# Patient Record
Sex: FEMALE | Race: WHITE | URBAN - METROPOLITAN AREA
[De-identification: names, ages, dates, MRNs, and addresses within clinical notes are randomized per-mention and may not be internally consistent; named-entity substitution may affect disease eponyms.]

---

## 2017-04-12 ENCOUNTER — EMERGENCY (EMERGENCY)
Facility: HOSPITAL | Age: 47
LOS: 0 days | Discharge: HOME | End: 2017-04-12

## 2017-06-27 DIAGNOSIS — K57.32 DIVERTICULITIS OF LARGE INTESTINE WITHOUT PERFORATION OR ABSCESS WITHOUT BLEEDING: ICD-10-CM

## 2017-06-27 DIAGNOSIS — Z98.51 TUBAL LIGATION STATUS: ICD-10-CM

## 2017-06-27 DIAGNOSIS — R10.9 UNSPECIFIED ABDOMINAL PAIN: ICD-10-CM

## 2017-06-27 DIAGNOSIS — Z98.890 OTHER SPECIFIED POSTPROCEDURAL STATES: ICD-10-CM

## 2017-11-20 PROBLEM — Z00.00 ENCOUNTER FOR PREVENTIVE HEALTH EXAMINATION: Status: ACTIVE | Noted: 2017-11-20

## 2018-02-06 ENCOUNTER — APPOINTMENT (OUTPATIENT)
Dept: BREAST CENTER | Facility: CLINIC | Age: 48
End: 2018-02-06
Payer: COMMERCIAL

## 2018-02-06 VITALS
WEIGHT: 135 LBS | HEART RATE: 73 BPM | BODY MASS INDEX: 23.92 KG/M2 | DIASTOLIC BLOOD PRESSURE: 80 MMHG | SYSTOLIC BLOOD PRESSURE: 120 MMHG | HEIGHT: 63 IN | OXYGEN SATURATION: 97 %

## 2018-02-06 DIAGNOSIS — Z80.3 FAMILY HISTORY OF MALIGNANT NEOPLASM OF BREAST: ICD-10-CM

## 2018-02-06 DIAGNOSIS — Z80.1 FAMILY HISTORY OF MALIGNANT NEOPLASM OF TRACHEA, BRONCHUS AND LUNG: ICD-10-CM

## 2018-02-06 DIAGNOSIS — Z87.891 PERSONAL HISTORY OF NICOTINE DEPENDENCE: ICD-10-CM

## 2018-02-06 PROCEDURE — 99212 OFFICE O/P EST SF 10 MIN: CPT

## 2018-12-18 ENCOUNTER — APPOINTMENT (OUTPATIENT)
Dept: BREAST CENTER | Facility: CLINIC | Age: 48
End: 2018-12-18
Payer: COMMERCIAL

## 2019-02-14 ENCOUNTER — APPOINTMENT (OUTPATIENT)
Dept: BREAST CENTER | Facility: CLINIC | Age: 49
End: 2019-02-14
Payer: COMMERCIAL

## 2019-02-14 VITALS
HEIGHT: 63 IN | BODY MASS INDEX: 23.92 KG/M2 | TEMPERATURE: 98.2 F | SYSTOLIC BLOOD PRESSURE: 110 MMHG | WEIGHT: 135 LBS | DIASTOLIC BLOOD PRESSURE: 82 MMHG

## 2019-02-14 DIAGNOSIS — R92.2 INCONCLUSIVE MAMMOGRAM: ICD-10-CM

## 2019-02-14 PROCEDURE — 99212 OFFICE O/P EST SF 10 MIN: CPT

## 2020-01-07 ENCOUNTER — APPOINTMENT (OUTPATIENT)
Dept: BREAST CENTER | Facility: CLINIC | Age: 50
End: 2020-01-07
Payer: COMMERCIAL

## 2020-01-07 DIAGNOSIS — N60.12 DIFFUSE CYSTIC MASTOPATHY OF LEFT BREAST: ICD-10-CM

## 2020-01-07 DIAGNOSIS — N60.11 DIFFUSE CYSTIC MASTOPATHY OF LEFT BREAST: ICD-10-CM

## 2020-02-13 ENCOUNTER — APPOINTMENT (OUTPATIENT)
Dept: BREAST CENTER | Facility: CLINIC | Age: 50
End: 2020-02-13
Payer: COMMERCIAL

## 2020-02-13 VITALS
DIASTOLIC BLOOD PRESSURE: 76 MMHG | SYSTOLIC BLOOD PRESSURE: 122 MMHG | HEIGHT: 63 IN | TEMPERATURE: 98.5 F | BODY MASS INDEX: 23.92 KG/M2 | WEIGHT: 135 LBS

## 2020-02-13 DIAGNOSIS — R92.8 OTHER ABNORMAL AND INCONCLUSIVE FINDINGS ON DIAGNOSTIC IMAGING OF BREAST: ICD-10-CM

## 2020-02-13 PROBLEM — N60.12 BILATERAL FIBROCYSTIC BREAST DISEASE (FCBD): Status: ACTIVE | Noted: 2018-02-06

## 2020-02-13 PROCEDURE — 99213 OFFICE O/P EST LOW 20 MIN: CPT

## 2020-02-13 NOTE — DATA REVIEWED
[FreeTextEntry1] : B/L Screening Mammo & Sono - 12/02/19:\par MAMMOGRAM:\par Tomosynthesis and 2D imaging of the breast(s) were performed. Current study was also\par evaluated with a computer aided detection (CAD) system.\par Breast density: There are scattered areas of fibroglandular density.\par No significant masses, calcifications, or other findings are seen in the right breast.\par In the central left breast seen only in the CC projection there is an asymmetry for\par which additional imaging is recommended.\par Mammo BI-RADS 0: INCOMPLETE - NEED ADDITIONAL IMAGING EVALUATION\par US BREAST COMPLETE BILATERAL\par Ultrasound evaluation was performed including examination of all four quadrants of the\par breast(s) and the retroareolar region(s).\par No suspicious abnormalities were seen sonographically in either breast.\par Ultrasound BI-RADS 1: NEGATIVE\par OVERALL IMPRESSION: OVERALL BI-RADS 0: INCOMPLETE\par Indeterminant asymmetry in the left breast. Additional spot compression view, lateral\par view and possible ultrasound is recommended.\par There is no mammographic or sonographic evidence of malignancy in the right breast.\par An additional imaging exam of the left breast(s) is recommended.\par \par Left Uni Dx Mammo & Sono - 12/06/19:\par MAMMOGRAM:\par Tomosynthesis and 2D imaging of the breast(s) were performed. Current study was also\par evaluated with a computer aided detection (CAD) system.\par Breast Density: Heterogeneously dense, which may obscure small masses.\par The previously questioned asymmetry in the left breast has effaced on additional\par views, representing overlapping fibroglandular tissue. No significant masses,\par calcifications, or other findings are seen in the left breast.\par US BREAST LIMITED LEFT\par Color flow, Gray scale and real-time ultrasound of the left breast was performed and\par targeted to the area(s) of interest.\par There is an anechoic benign-appearing well-circumscribed 6 mm cyst in the left breast\par at 12:00 axis at 1 cm from nipple. There is minimal retroareolar duct ectasia without\par evidence of solid mass. No suspicious abnormalities were seen sonographically in the\par left breast.\par OVERALL IMPRESSION:\par There is no mammographic or sonographic evidence of malignancy.\par BI-RADS 2: BENIGN\par

## 2020-02-13 NOTE — HISTORY OF PRESENT ILLNESS
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 Courtney).\par s/p Left NLOC 5/1/06 - adipose breast parenchymal tissue with patchy periductal stromal fibrosis (Dr. Courtney).\par FHx breast cancer - paternal cousin x2, 40s.\par \par Her Pili Model risk assessment is:\par 2.3 % in five years \par 16.0 % in her lifetime.\par \par PAT CONTRERAS is a 49 year old female patient who presents today for follow up.\par Since her last visit, she has no new breast related complaints. \par Imaging was done on 12/02/19, which revealed an indeterminate asymmetry in the left breast.\par Additional imaging was performed on 12/06/19, which ultimately revealed no mammographic or sonographic evidence of malignancy.\par \par She presents today for evaluation and imaging review.

## 2020-02-13 NOTE — PHYSICAL EXAM
[Normocephalic] : normocephalic [Atraumatic] : atraumatic [Examined in the supine and seated position] : examined in the supine and seated position [No dominant masses] : no dominant masses in right breast  [Symmetrical] : symmetrical [No dominant masses] : no dominant masses left breast [No Nipple Discharge] : no left nipple discharge [No Ulceration] : no ulceration [No Rashes] : no rashes [Breast Nipple Inversion] : nipples not inverted [Breast Nipple Retraction] : nipples not retracted [de-identified] : well healed surgical scar.\par bilateral scattered fibroglandular densities. [de-identified] : No axillary lymphadenopathy appreciated. [de-identified] : No axillary lymphadenopathy appreciated.

## 2020-02-13 NOTE — ASSESSMENT
[FreeTextEntry1] : PAT is a samira 49 year old patient who presented today for follow up.\par She has been doing well with no new breast related complaints. \par Imaging was done recently which ultimately revealed no mammographic or sonographic evidence of malignancy, as detailed above. \par Physical exam was unrevealing today.\par \par Imaging with a bilateral screening mammogram and sonogram will be due in December 2020, \par and that will be scheduled today. \par She will return for follow-up and clinical breast exam in December 2020 as well.\par \par I spent a total of 15 minutes of face to face time with this patient, greater than 50% of which was spent in counseling and/or coordination of care.\par All of her questions were appropriately answered.\par She knows to call with any concerns.

## 2020-02-13 NOTE — PAST MEDICAL HISTORY
[FreeTextEntry5] : none [FreeTextEntry6] : none [FreeTextEntry8] : none. [FreeTextEntry7] : OCP for less than one year and discontinued 26 years old.

## 2021-08-10 NOTE — PAST MEDICAL HISTORY
[Menstruating] : The patient is menstruating [Menarche Age ____] : age at menarche was [unfilled] [Irregular Cycle Intervals] : are  irregular [Total Preg ___] : G[unfilled] [Live Births ___] : P[unfilled]  [Age At Live Birth ___] : Age at live birth: [unfilled] [FreeTextEntry5] : none [FreeTextEntry6] : none [FreeTextEntry7] : OCP for less than one year and discontinued 26 years old. [FreeTextEntry8] : none.

## 2021-08-10 NOTE — DATA REVIEWED
[FreeTextEntry1] : B/L Screening Mammo & Sono - 02/22/2021:\par MAMMOGRAM: \par  \par Tomosynthesis and 2D imaging of the breast(s) were performed.  Current study was also evaluated with a computer aided detection (CAD) system.\par  \par Breast density: There are scattered areas of fibroglandular density.\par  \par No significant masses, calcifications, or other findings are seen in either breast.\par  \par Mammo BI-RADS 1: NEGATIVE\par  \par  \par US BREAST COMPLETE BILATERAL\par  \par Ultrasound evaluation was performed including examination of all four quadrants of the breast(s) and the retroareolar region(s).\par  \par No suspicious abnormalities were seen sonographically in either breast. \par  \par Both axillary regions are unremarkable\par  \par Ultrasound BI-RADS 1: NEGATIVE\par  \par OVERALL IMPRESSION: OVERALL BI-RADS 1: NEGATIVE\par  \par There is no mammographic or sonographic evidence of malignancy.\par  \par A 1 year screening mammogram and ultrasound of both breast(s) is recommended.

## 2021-08-10 NOTE — HISTORY OF PRESENT ILLNESS
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 Courtney).\par s/p Left NLOC 5/1/06 - adipose breast parenchymal tissue with patchy periductal stromal fibrosis (Dr. Courtney).\par FHx breast cancer - paternal cousin x2, 40s.\par \par Her Pili Model risk assessment is:\par 2.3 % in five years \par 16.0 % in her lifetime.\par \par PAT CONTRERAS is a 50 year old female patient who presents today for follow up.\par Since her last visit, she has no new breast related complaints. \par Imaging was done on 02/22/2021, which revealed there is no mammographic or sonographic evidence of malignancy.\par \par She presents today for evaluation and imaging review.

## 2021-08-13 ENCOUNTER — APPOINTMENT (OUTPATIENT)
Dept: BREAST CENTER | Facility: CLINIC | Age: 51
End: 2021-08-13

## 2022-06-23 ENCOUNTER — APPOINTMENT (OUTPATIENT)
Dept: PAIN MANAGEMENT | Facility: CLINIC | Age: 52
End: 2022-06-23

## 2022-06-23 DIAGNOSIS — Z78.9 OTHER SPECIFIED HEALTH STATUS: ICD-10-CM

## 2024-04-11 ENCOUNTER — APPOINTMENT (OUTPATIENT)
Dept: CARDIOLOGY | Facility: CLINIC | Age: 54
End: 2024-04-11
Payer: COMMERCIAL

## 2024-04-11 VITALS — TEMPERATURE: 97.6 F | WEIGHT: 155 LBS | HEIGHT: 63 IN | BODY MASS INDEX: 27.46 KG/M2

## 2024-04-11 VITALS — HEART RATE: 81 BPM | SYSTOLIC BLOOD PRESSURE: 110 MMHG | DIASTOLIC BLOOD PRESSURE: 68 MMHG

## 2024-04-11 DIAGNOSIS — E87.5 HYPERKALEMIA: ICD-10-CM

## 2024-04-11 DIAGNOSIS — Z82.3 FAMILY HISTORY OF STROKE: ICD-10-CM

## 2024-04-11 DIAGNOSIS — R94.31 ABNORMAL ELECTROCARDIOGRAM [ECG] [EKG]: ICD-10-CM

## 2024-04-11 DIAGNOSIS — Z82.49 FAMILY HISTORY OF ISCHEMIC HEART DISEASE AND OTHER DISEASES OF THE CIRCULATORY SYSTEM: ICD-10-CM

## 2024-04-11 DIAGNOSIS — Z78.9 OTHER SPECIFIED HEALTH STATUS: ICD-10-CM

## 2024-04-11 DIAGNOSIS — E78.2 MIXED HYPERLIPIDEMIA: ICD-10-CM

## 2024-04-11 PROCEDURE — 93000 ELECTROCARDIOGRAM COMPLETE: CPT

## 2024-04-11 PROCEDURE — 99204 OFFICE O/P NEW MOD 45 MIN: CPT | Mod: 25

## 2024-04-11 RX ORDER — ROSUVASTATIN CALCIUM 5 MG/1
5 TABLET, FILM COATED ORAL
Refills: 0 | Status: ACTIVE | COMMUNITY

## 2024-06-18 ENCOUNTER — APPOINTMENT (OUTPATIENT)
Dept: CARDIOLOGY | Facility: CLINIC | Age: 54
End: 2024-06-18
Payer: COMMERCIAL

## 2024-06-18 PROBLEM — R94.31 ABNORMAL EKG: Status: ACTIVE | Noted: 2024-06-18

## 2024-06-18 PROCEDURE — 93880 EXTRACRANIAL BILAT STUDY: CPT

## 2024-06-19 NOTE — ASSESSMENT
[FreeTextEntry1] : Risk factors for CAD: hyperlipidemia and extensive FH of CAD - will obtain TTE to r/o structural /functional abnormalities - will do CT heart calcium score to r/o CAD - Carotid arteries US to r/o stenosis  Abnormal EKG: Short MS and also low voltage -Rule out structural abnormality.   HLD: , TG 78, HDL 47, LDL 80 (01/24) - cholesterol is well controlled. Will continue Rosuvastatin 5 mg PO QD - encouraged patient to follow healthy exercise and eating habits, focusing on Mediterranean style of eating and aiming for the recommended 150 minutes per week of moderate physical activity.  - will recheck K level as it was 5.4 on previous lab work.   F/U in 3 months

## 2024-06-19 NOTE — HISTORY OF PRESENT ILLNESS
[FreeTextEntry1] : Ms. Armendariz is a 54yo F with PMHx of HLD, extensive FH of CAD presents to establish care. Her PMD is Dr. Neal Sanchez. Patient denies chest pain, SOB, palpitations. She feels well overall. She wanted to get work up due to having extensive family history of CAD, CVA.

## 2024-06-19 NOTE — ADDENDUM
[FreeTextEntry1] : Spoke to pt via phone. Pt reports shortness of breath when she climbs stairs. Pt with strong FMHx: Mother: A-fib, multiple CVAs, first one in her 50s. Father: MI in his early 50s. Maternal grandmother: Valvular disease with surgery.   Plan: Proceed with 2D ECHO Follow up after the test

## 2024-07-02 ENCOUNTER — TRANSCRIPTION ENCOUNTER (OUTPATIENT)
Age: 54
End: 2024-07-02

## 2024-08-06 ENCOUNTER — APPOINTMENT (OUTPATIENT)
Dept: CARDIOLOGY | Facility: CLINIC | Age: 54
End: 2024-08-06

## 2024-08-06 PROBLEM — I31.39 PERICARDIAL EFFUSION: Status: ACTIVE | Noted: 2024-08-06

## 2024-08-06 PROBLEM — Z86.39 HISTORY OF HYPERKALEMIA: Status: RESOLVED | Noted: 2024-04-11 | Resolved: 2024-08-06

## 2024-08-06 PROCEDURE — 99214 OFFICE O/P EST MOD 30 MIN: CPT

## 2024-08-06 PROCEDURE — G2211 COMPLEX E/M VISIT ADD ON: CPT | Mod: NC

## 2024-08-06 NOTE — REASON FOR VISIT
[FreeTextEntry1] : Diagnostic Tests: ------------------------ [id number] with short OK. Low voltage.  ------------------------ Echo:  ------------------------ US:  24-Carotid: No ICA stenosis.  ------------------------- CT:  24-CT CAC; CAC 0. Small pericardial effusion.

## 2024-08-06 NOTE — HISTORY OF PRESENT ILLNESS
[FreeTextEntry1] : Ms. Armendariz is a 54yo F with PMHx of HLD, extensive FH of CAD presents for follow up. Her PMD is Dr. Neal Sanchez. Patient denies chest pain, SOB, palpitations. She feels well overall. She wanted to get work up due to having extensive family history of CAD, CVA.  08/06/24-Patient is feeling well. She has no new complaints. Her echo was denied. CT showing possible pericardial effusion.

## 2024-08-06 NOTE — ASSESSMENT
[FreeTextEntry1] : Risk factors for CAD: hyperlipidemia and extensive FH of CAD - will obtain TTE to r/o structural /functional abnormalities  Pericardial effusion: Read on CT Chest -Check TTE to assess for pericardial effusion.   Abnormal EKG: Short NM and also low voltage -Rule out structural abnormality.   HLD: , TG 78, HDL 47, LDL 80 (01/24) - cholesterol is well controlled. Will continue Rosuvastatin 5 mg PO QD - encouraged patient to follow healthy exercise and eating habits, focusing on Mediterranean style of eating and aiming for the recommended 150 minutes per week of moderate physical activity.  - K normal on repeat lab.   Follow up in 6 months.

## 2024-10-15 ENCOUNTER — APPOINTMENT (OUTPATIENT)
Dept: CARDIOLOGY | Facility: CLINIC | Age: 54
End: 2024-10-15
Payer: COMMERCIAL

## 2024-10-15 PROCEDURE — 93306 TTE W/DOPPLER COMPLETE: CPT

## 2024-11-26 ENCOUNTER — NON-APPOINTMENT (OUTPATIENT)
Age: 54
End: 2024-11-26

## 2025-02-18 ENCOUNTER — NON-APPOINTMENT (OUTPATIENT)
Age: 55
End: 2025-02-18

## 2025-02-18 ENCOUNTER — APPOINTMENT (OUTPATIENT)
Dept: CARDIOLOGY | Facility: CLINIC | Age: 55
End: 2025-02-18
Payer: COMMERCIAL

## 2025-02-18 VITALS
BODY MASS INDEX: 25.61 KG/M2 | DIASTOLIC BLOOD PRESSURE: 70 MMHG | HEIGHT: 64 IN | HEART RATE: 70 BPM | SYSTOLIC BLOOD PRESSURE: 116 MMHG | WEIGHT: 150 LBS

## 2025-02-18 DIAGNOSIS — E78.2 MIXED HYPERLIPIDEMIA: ICD-10-CM

## 2025-02-18 DIAGNOSIS — R94.31 ABNORMAL ELECTROCARDIOGRAM [ECG] [EKG]: ICD-10-CM

## 2025-02-18 DIAGNOSIS — I31.39 OTHER PERICARDIAL EFFUSION (NONINFLAMMATORY): ICD-10-CM

## 2025-02-18 PROCEDURE — 99214 OFFICE O/P EST MOD 30 MIN: CPT

## 2025-02-18 PROCEDURE — G2211 COMPLEX E/M VISIT ADD ON: CPT | Mod: NC
